# Patient Record
Sex: FEMALE | Race: WHITE | NOT HISPANIC OR LATINO | Employment: OTHER | URBAN - METROPOLITAN AREA
[De-identification: names, ages, dates, MRNs, and addresses within clinical notes are randomized per-mention and may not be internally consistent; named-entity substitution may affect disease eponyms.]

---

## 2021-07-13 ENCOUNTER — TELEPHONE (OUTPATIENT)
Dept: OBGYN CLINIC | Facility: CLINIC | Age: 71
End: 2021-07-13

## 2021-07-13 DIAGNOSIS — Z79.890 POSTMENOPAUSAL HORMONE THERAPY: Primary | ICD-10-CM

## 2021-07-13 NOTE — TELEPHONE ENCOUNTER
Patient called stating that the Premarin 0 3 mg tablet is not working  She requesting Dr Gonzalez Avila to please transmit a new script for Premarin 0 45 mg Tablets 90 days supply with 4 refills to [facility]  She was seen by Dr Gonzalez Avila for [facility] on 09/25/20  She request a call back once the prescription sent  Dr Gonzalez Avila please address

## 2021-07-14 PROBLEM — N88.2 CERVICAL STENOSIS (UTERINE CERVIX): Status: ACTIVE | Noted: 2021-07-14

## 2021-07-14 PROBLEM — Z79.890 POSTMENOPAUSAL HORMONE THERAPY: Status: ACTIVE | Noted: 2021-07-14

## 2021-07-14 PROBLEM — Z80.49 FAMILY HISTORY OF UTERINE CANCER: Status: ACTIVE | Noted: 2021-07-14

## 2021-07-14 NOTE — TELEPHONE ENCOUNTER
Rx sent for 90 day supply, no refills  Pt has not had mammogram (in our system) since 2019  Needs mammogram tami when on HRT  Should be continuing with progesterone daily  Needs check up by October Thanks

## 2021-07-14 NOTE — TELEPHONE ENCOUNTER
Pt informed prescription refill has been provided and needs follow- up mammogram while on HRT, an order was provided at last [facility] 9/2020  Also advised Dr Ruth Umanzor would like to see pt for a f/u appointment by October  Pt transferred to reception to schedule her follow up appointment

## 2021-08-13 ENCOUNTER — VBI (OUTPATIENT)
Dept: ADMINISTRATIVE | Facility: OTHER | Age: 71
End: 2021-08-13

## 2021-08-13 NOTE — TELEPHONE ENCOUNTER
Upon review of the In Basket request we were able to locate, review, and update the patient chart as requested for Immunization(s) Influenza, Mammogram and Pap Smear (HPV) aka Cervical Cancer Screening  Any additional questions or concerns should be emailed to the Practice Liaisons via [email or web address] email, please do not reply via In Basket      Thank you  Nathan Win